# Patient Record
Sex: MALE | Race: WHITE
[De-identification: names, ages, dates, MRNs, and addresses within clinical notes are randomized per-mention and may not be internally consistent; named-entity substitution may affect disease eponyms.]

---

## 2017-02-28 ENCOUNTER — HOSPITAL ENCOUNTER (EMERGENCY)
Dept: HOSPITAL 33 - ED | Age: 42
LOS: 1 days | Discharge: HOME | End: 2017-03-01
Payer: COMMERCIAL

## 2017-02-28 DIAGNOSIS — R41.82: Primary | ICD-10-CM

## 2017-02-28 DIAGNOSIS — J32.9: ICD-10-CM

## 2017-02-28 DIAGNOSIS — F12.90: ICD-10-CM

## 2017-02-28 DIAGNOSIS — F10.129: ICD-10-CM

## 2017-02-28 DIAGNOSIS — R07.9: ICD-10-CM

## 2017-02-28 LAB
ALBUMIN SERPL-MCNC: 3.7 G/DL (ref 3.4–5)
ALP SERPL-CCNC: 71 U/L (ref 46–116)
ALT SERPL-CCNC: 44 U/L (ref 12–78)
ANION GAP SERPL CALC-SCNC: 18.5 MEQ/L (ref 5–15)
APAP SPEC-MCNC: < 2 UG/ML (ref 10–30)
AST SERPL QL: 27 U/L (ref 15–37)
BASOPHILS NFR BLD AUTO: 1.2 % (ref 0–0.4)
BILIRUB BLD-MCNC: 0.2 MG/DL (ref 0.2–1)
BUN SERPL-MCNC: 8 MG/DL (ref 9–20)
CHLORIDE SERPL-SCNC: 106 MEQ/L (ref 98–107)
CO2 SERPL-SCNC: 23.4 MEQ/L (ref 21–32)
GLUCOSE SERPL-MCNC: 114 MG/DL (ref 70–110)
LIPASE SERPL-CCNC: 96 U/L (ref 73–393)
MCH RBC QN AUTO: 33.3 PG (ref 26–32)
NEUTROPHILS NFR BLD AUTO: 50.9 % (ref 36–66)
PLATELET # BLD AUTO: 243 K/MM3 (ref 150–450)
POTASSIUM SERPLBLD-SCNC: 3.6 MEQ/L (ref 3.5–5.1)
PROT SERPL-MCNC: 7.4 GM/DL (ref 6.4–8.2)
RBC # BLD AUTO: 5.14 M/MM3 (ref 4.1–5.6)
SODIUM SERPL-SCNC: 144 MEQ/L (ref 136–145)
TROPONIN T SERPL HS-MCNC: < 0.017 NG/ML (ref 0–0.06)
WBC # BLD AUTO: 9.3 K/MM3 (ref 4–10.5)

## 2017-02-28 PROCEDURE — 80053 COMPREHEN METABOLIC PANEL: CPT

## 2017-02-28 PROCEDURE — 85025 COMPLETE CBC W/AUTO DIFF WBC: CPT

## 2017-02-28 PROCEDURE — 36415 COLL VENOUS BLD VENIPUNCTURE: CPT

## 2017-02-28 PROCEDURE — 83986 ASSAY PH BODY FLUID NOS: CPT

## 2017-02-28 PROCEDURE — 84484 ASSAY OF TROPONIN QUANT: CPT

## 2017-02-28 PROCEDURE — 96365 THER/PROPH/DIAG IV INF INIT: CPT

## 2017-02-28 PROCEDURE — 70450 CT HEAD/BRAIN W/O DYE: CPT

## 2017-02-28 PROCEDURE — 71010: CPT

## 2017-02-28 PROCEDURE — 81002 URINALYSIS NONAUTO W/O SCOPE: CPT

## 2017-02-28 PROCEDURE — 80307 DRUG TEST PRSMV CHEM ANLYZR: CPT

## 2017-02-28 PROCEDURE — 93005 ELECTROCARDIOGRAM TRACING: CPT

## 2017-02-28 PROCEDURE — 80320 DRUG SCREEN QUANTALCOHOLS: CPT

## 2017-02-28 PROCEDURE — 83690 ASSAY OF LIPASE: CPT

## 2017-02-28 PROCEDURE — 93041 RHYTHM ECG TRACING: CPT

## 2017-02-28 PROCEDURE — 96360 HYDRATION IV INFUSION INIT: CPT

## 2017-02-28 PROCEDURE — 99284 EMERGENCY DEPT VISIT MOD MDM: CPT

## 2017-02-28 PROCEDURE — 82962 GLUCOSE BLOOD TEST: CPT

## 2017-02-28 PROCEDURE — 82150 ASSAY OF AMYLASE: CPT

## 2017-02-28 NOTE — ERPHSYRPT
- History of Present Illness


Time Seen by Provider: 02/28/17 21:17


Source: patient


Exam Limitations: no limitations


Physician History: 





TODAY PT HAS BEEN CONSUMING ALCOHOLIC BEVERAGES SINCE GETTING OFF WORK TODAY 

AND C/O CHEST PAIN, ABDOMINAL PAIN AND HAS AMS; DENIES FEVER, VOMITING, DIARRHEA

, COUGH, SWELLING, RASH, HEADACHE. LAST BM WAS THIS AM & WNL.


Allergies/Adverse Reactions: 








No Known Drug Allergies Allergy (Verified 02/28/17 21:54)


 





Home Medications: 








No Home Meds 1 ea  UD 02/28/17 [History]





Hx Influenza Vaccination/Date Given: No


Hx Pneumococcal Vaccination/Date Given: No





- Review of Systems


Constitutional: No Fever


Respiratory: No Cough, No Dyspnea


Cardiac: Chest Pain


Abdominal/Gastrointestinal: Abdominal Pain, No Vomiting, No Diarrhea


Genitourinary Symptoms: No Dysuria


Musculoskeletal: No Back Pain, No Neck Pain


Neurological: No Headache


Endocrine: No Excessive Sweating


All Other Systems: Reviewed and Negative





- Past Medical History


Pertinent Past Medical History: No





- Past Surgical History


Past Surgical History: Yes


Musculoskeletal: Orthopedic Surgery


Other Surgical History: knee surg





- Social History


Smoking Status: Current every day smoker


Exposure to second hand smoke: Yes


Drug Use: none


Patient Lives Alone: No





- Nursing Vital Signs


Nursing Vital Signs: 


 Initial Vital Signs











Temperature                    97.6 F


 


Temperature Source             Oral


 


Pulse Rate                     88


 


Respiratory Rate               18


 


Blood Pressure []              101/65


 


Pain Intensity                 8

















- Physical Exam


General Appearance: lethargy (BUT WILL ANSWER QUESTIONS)


Eye Exam: PERRL/EOMI


Ears, Nose, Throat Exam: TMs normal, moist mucous membranes, pharyngeal erythema


Neck Exam: normal inspection


Respiratory Exam: lungs clear


Cardiovascular Exam: normal heart sounds


Gastrointestinal/Abdomen Exam: soft, normal bowel sounds, No tenderness


Back Exam: normal range of motion


Extremity Exam: normal inspection, No pedal edema


Neurologic Exam: oriented x 3, cooperative, sensation nml, No motor deficits


Skin Exam: warm, dry


**SpO2 Interpretation**: normal


SpO2: 96


Oxygen Delivery: Room Air





- Course


Nursing assessment & vital signs reviewed: Yes


EKG Interpreted by Me: RATE (89), Sinus Rhythm, NORMAL AXIS, NORMAL INTERVALS





- Radiology Exams


  ** Chest


X-ray Interpretation: Interpreted by me, No Pneumonia





- CT Exams


  ** Head


CT Interpretation: Tele-radiologist Report (NO ACUTE INTRACRANIAL FINDINGS. 

SINUSITIS AND MILD RIGHT MASTOID OPACIFICATION.)


Ordered Tests: 


 Active Orders 24 hr











 Category Date Time Status


 


 Accucheck STAT Care  02/28/17 21:21 Active


 


 Cardiac Monitor STAT Care  02/28/17 21:21 Active


 


 EKG-ER Only STAT Care  02/28/17 21:21 Active


 


 IV Insertion STAT Care  02/28/17 21:21 Active


 


 Pulse Oximetry (ED) STAT Care  02/28/17 21:21 Active


 


 CHEST 1 VIEW (PORTABLE) Stat Exams  02/28/17 21:22 Taken


 


 HEAD WITHOUT CONTRAST [CT] Stat Exams  02/28/17 22:48 Taken


 


 ACETAMINOPHEN Stat Lab  02/28/17 21:42 Completed


 


 AMYLASE Stat Lab  02/28/17 21:42 Completed


 


 CBC W DIFF Stat Lab  02/28/17 21:42 Completed


 


 CMP Stat Lab  02/28/17 21:42 Completed


 


 Ethyl Alcohol,Urine Stat Lab  02/28/17 21:21 Completed


 


 LIPASE Stat Lab  02/28/17 21:42 Completed


 


 SALICYLATE Stat Lab  02/28/17 21:42 Completed


 


 TROPONIN Stat Lab  02/28/17 21:42 Completed


 


 UA Stat Lab  02/28/17 23:30 Completed


 


 Urine Triage Profile Stat Lab  02/28/17 23:30 Completed








Medication Summary














Discontinued Medications














Generic Name Dose Route Start Last Admin





  Trade Name Freq  PRN Reason Stop Dose Admin


 


Aspirin  324 mg  02/28/17 21:23  02/28/17 21:43





  Baby Aspirin 81 Mg Chew***  PO  02/28/17 21:24  324 mg





  STAT ONE   Administration


 


Aspirin  Confirm  02/28/17 21:41  





  Baby Aspirin 81 Mg Chew***  Administered  02/28/17 21:42  





  Dose   





  324 mg   





  .ROUTE   





  .STK-MED ONE   


 


Sodium Chloride  1,000 mls @ 999 mls/hr  02/28/17 21:21  02/28/17 21:43





  Sodium Chloride 0.9% 1000 Ml  IV  02/28/17 22:21  999 mls/hr





  .Q1H1M STA   Administration


 


Sodium Chloride  Confirm  02/28/17 21:41  





  Sodium Chloride 0.9% 1000 Ml  Administered  02/28/17 21:42  





  Dose   





  1,000 mls @ ud   





  .ROUTE   





  .STK-MED ONE   


 


Nitroglycerin  0.4 mg  02/28/17 21:23  02/28/17 21:43





  Nitrostat 0.4 Mg (Ed)***  SL  02/28/17 21:24  0.4 mg





  STAT ONE   Administration


 


Nitroglycerin  Confirm  02/28/17 21:41  





  Nitrostat 0.4 Mg (Ed)***  Administered  02/28/17 21:42  





  Dose   





  0.4 mg   





  SL   





  .STK-MED ONE   











Lab/Rad Data: 


 Laboratory Result Diagrams





 02/28/17 21:42 





 02/28/17 21:42 





 Laboratory Results











  02/28/17 02/28/17 02/28/17 Range/Units





  23:30 23:30 21:42 


 


WBC     (4.0-10.5)  K/mm3


 


RBC     (4.1-5.6)  M/mm3


 


Hgb     (12.5-18.0)  gm/dl


 


Hct     (42-50)  %


 


MCV     ()  fl


 


MCH     (26-32)  pg


 


MCHC     (32-36)  g/dl


 


RDW     (11.5-14.0)  %


 


Plt Count     (150-450)  K/mm3


 


MPV     (6-9.5)  fl


 


Gran %     (36.0-66.0)  %


 


Lymphocytes %     (24.0-44.0)  %


 


Monocytes %     (0.0-12.0)  %


 


Eosinophils %     (0.00-5.0)  %


 


Basophils %     (0.0-0.4)  %


 


Basophils #     (0-0.4)  


 


Sodium     (136-145)  mEq/L


 


Potassium     (3.5-5.1)  mEq/L


 


Chloride     ()  mEq/L


 


Carbon Dioxide     (21-32)  mEq/L


 


Anion Gap     (5-15)  MEQ/L


 


BUN     (9-20)  mg/dL


 


Creatinine     (0.55-1.30)  mg/dl


 


Estimated GFR     ML/MIN


 


Glucose     ()  MG/DL


 


Calcium     (8.5-10.1)  mg/dL


 


Total Bilirubin     (0.2-1.0)  mg/dL


 


AST     (15-37)  U/L


 


ALT     (12-78)  U/L


 


Alkaline Phosphatase     ()  U/L


 


Troponin I    < 0.017  (0.000-0.056)  ng/ml


 


Serum Total Protein     (6.4-8.2)  gm/dL


 


Albumin     (3.4-5.0)  g/dL


 


Amylase    52  ()  U/L


 


Lipase    96  ()  U/L


 


Ur Collection Type   CLEAN CATCH   


 


Urine Color   YELLOW   (YELLOW)  


 


Urine Appearance   CLEAR   (CLEAR)  


 


Ur Specific Gravity   <=1.005   (1.005-1.025)  


 


Urine Protein   NEGATIVE   (Negative)  


 


Urine Glucose (UA)   NEGATIVE   (NEGATIVE)  mg/dL


 


Urine Ketones   NEGATIVE   (NEGATIVE)  


 


Urine Nitrite   NEGATIVE   (NEGATIVE)  


 


Urine Bilirubin   NEGATIVE   (NEGATIVE)  


 


Urine Urobilinogen   0.2   (0-1)  mg/dL


 


Urine WBC (Auto)   NEGATIVE   (NEGATIVE)  


 


Urine RBC (Auto)   NEGATIVE   (0-5)  Finesse/ul


 


Salicylates     (2.8-20.0)  mg/dl


 


Urine Opiates Level  NEG.    (NEGATIVE)  


 


Ur Methadone  NEG.    (NEGATIVE)  


 


Acetaminophen     (10-30)  ug/ml


 


Urine Barbiturates  NEG.    (NEGATIVE)  


 


Ur Phencyclidine (PCP)  NEG.    (NEGATIVE)  


 


Urine Amphetamine  NEG.    (NEGATIVE)  


 


U Benzodiazepine Level  NEG.    (NEGATIVE)  


 


Urine Cocaine  NEG.    (NEGATIVE)  


 


Urine Marijuana (THC)  POS.    (NEGATIVE)  


 


Urine pH   5.5   (3-8.5)  


 


Urine Ethyl Alcohol     (0.00-20)  mg/dl


 


Specimen Received   2/28/17 2330   














  02/28/17 02/28/17 02/28/17 Range/Units





  21:42 21:42 21:21 


 


WBC   9.3   (4.0-10.5)  K/mm3


 


RBC   5.14   (4.1-5.6)  M/mm3


 


Hgb   17.1   (12.5-18.0)  gm/dl


 


Hct   49.9   (42-50)  %


 


MCV   97.1   ()  fl


 


MCH   33.3 H   (26-32)  pg


 


MCHC   34.3   (32-36)  g/dl


 


RDW   14.4 H   (11.5-14.0)  %


 


Plt Count   243   (150-450)  K/mm3


 


MPV   9.8 H   (6-9.5)  fl


 


Gran %   50.9   (36.0-66.0)  %


 


Lymphocytes %   37.5   (24.0-44.0)  %


 


Monocytes %   7.3   (0.0-12.0)  %


 


Eosinophils %   3.1   (0.00-5.0)  %


 


Basophils %   1.2   (0.0-0.4)  %


 


Basophils #   0.11   (0-0.4)  


 


Sodium  144    (136-145)  mEq/L


 


Potassium  3.6    (3.5-5.1)  mEq/L


 


Chloride  106    ()  mEq/L


 


Carbon Dioxide  23.4    (21-32)  mEq/L


 


Anion Gap  18.5 H    (5-15)  MEQ/L


 


BUN  8 L    (9-20)  mg/dL


 


Creatinine  0.90    (0.55-1.30)  mg/dl


 


Estimated GFR  > 60    ML/MIN


 


Glucose  114 H    ()  MG/DL


 


Calcium  8.6    (8.5-10.1)  mg/dL


 


Total Bilirubin  0.2    (0.2-1.0)  mg/dL


 


AST  27    (15-37)  U/L


 


ALT  44    (12-78)  U/L


 


Alkaline Phosphatase  71    ()  U/L


 


Troponin I     (0.000-0.056)  ng/ml


 


Serum Total Protein  7.4    (6.4-8.2)  gm/dL


 


Albumin  3.7    (3.4-5.0)  g/dL


 


Amylase     ()  U/L


 


Lipase     ()  U/L


 


Ur Collection Type     


 


Urine Color     (YELLOW)  


 


Urine Appearance     (CLEAR)  


 


Ur Specific Gravity     (1.005-1.025)  


 


Urine Protein     (Negative)  


 


Urine Glucose (UA)     (NEGATIVE)  mg/dL


 


Urine Ketones     (NEGATIVE)  


 


Urine Nitrite     (NEGATIVE)  


 


Urine Bilirubin     (NEGATIVE)  


 


Urine Urobilinogen     (0-1)  mg/dL


 


Urine WBC (Auto)     (NEGATIVE)  


 


Urine RBC (Auto)     (0-5)  Finesse/ul


 


Salicylates  5.0    (2.8-20.0)  mg/dl


 


Urine Opiates Level     (NEGATIVE)  


 


Ur Methadone     (NEGATIVE)  


 


Acetaminophen  < 2.0 L    (10-30)  ug/ml


 


Urine Barbiturates     (NEGATIVE)  


 


Ur Phencyclidine (PCP)     (NEGATIVE)  


 


Urine Amphetamine     (NEGATIVE)  


 


U Benzodiazepine Level     (NEGATIVE)  


 


Urine Cocaine     (NEGATIVE)  


 


Urine Marijuana (THC)     (NEGATIVE)  


 


Urine pH    5.5  (3-8.5)  


 


Urine Ethyl Alcohol    177 H  (0.00-20)  mg/dl


 


Specimen Received     














- Departure


Time of Disposition: 00:30


Departure Disposition: Home


Clinical Impression: 


 ALTERED MENTAL STATUS, ALCOHOL INTOXICATION, MARIJUANA USE, SINUSITIS, CHEST 

PAIN





Condition: Stable


Critical Care Time: No


Referrals: 


DOCTOR,NO FAMILY [Primary Care Provider] - 


Instructions:  Chest Pain, Sinusitis


Additional Instructions: 


FOLLOW UP WITH PRIVATE DOCTOR TOMORROW.





Prescriptions: 


Azithromycin 250 mg*** [Zithromax 250 MG TABLET***] 250 mg PO ZPACK #6 tablet

## 2017-03-01 VITALS — HEART RATE: 67 BPM | DIASTOLIC BLOOD PRESSURE: 69 MMHG | SYSTOLIC BLOOD PRESSURE: 104 MMHG | OXYGEN SATURATION: 97 %

## 2017-03-01 LAB
COLLECTION TYPE: (no result)
COMPLETE URINE MICROSCOPIC?: NO

## 2017-03-01 NOTE — XRAY
Indication: Acute mental status change.  Confusion.  No known injury.



Multiple contiguous axial images obtained through the head without contrast.



Comparison: None.



Normal appearing brain parenchyma, ventricles, and bony calvarium.  Mild

mucosal thickening of both ethmoid and left frontal sinuses.  Partial

opacification of the mastoid air cells, right greater than left.



Impression: No acute intracranial abnormalities.  Incidental paranasal sinus

disease.  Opacification of the mastoid air cells also presumed inflammatory.



Comment: Preliminary interpretation was made by [facility].  No discrepancy.



CTDI 68.32

## 2017-03-01 NOTE — XRAY
Indication: Chest pain.



Comparison: None



Portable chest underinflated and clear.  Heart and mediastinal structures

within normal limits for AP portable technique.  Bony thorax intact with old

right 4th/5th rib fractures.



Impression: Nonacute underinflated chest.

## 2018-12-30 ENCOUNTER — HOSPITAL ENCOUNTER (EMERGENCY)
Dept: HOSPITAL 33 - ED | Age: 43
Discharge: HOME | End: 2018-12-30
Payer: COMMERCIAL

## 2018-12-30 VITALS — SYSTOLIC BLOOD PRESSURE: 101 MMHG | HEART RATE: 92 BPM | DIASTOLIC BLOOD PRESSURE: 68 MMHG

## 2018-12-30 VITALS — OXYGEN SATURATION: 95 %
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
## 2018-12-30 DIAGNOSIS — S50.02XA: Primary | ICD-10-CM
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
## 2018-12-30 PROCEDURE — 73080 X-RAY EXAM OF ELBOW: CPT

## 2018-12-30 PROCEDURE — 99283 EMERGENCY DEPT VISIT LOW MDM: CPT

## 2018-12-30 NOTE — XRAY
Indication: Pain following fall.



Comparison: none



3 views of the left elbow obtained.  No bony, articular, or soft tissue

abnormalities.



Comment: Preliminary interpretation was made by [facility].  No discrepancy.
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
## 2020-05-18 ENCOUNTER — HOSPITAL ENCOUNTER (EMERGENCY)
Dept: HOSPITAL 33 - ED | Age: 45
LOS: 1 days | Discharge: ANOTHER HOSPITAL | End: 2020-05-19
Payer: SELF-PAY

## 2020-05-18 DIAGNOSIS — I20.0: Primary | ICD-10-CM

## 2020-05-18 DIAGNOSIS — Z72.0: ICD-10-CM

## 2020-05-18 DIAGNOSIS — I24.9: ICD-10-CM

## 2020-05-18 LAB
ALBUMIN SERPL-MCNC: 4.4 G/DL (ref 3.5–5)
ALP SERPL-CCNC: 73 U/L (ref 38–126)
ALT SERPL-CCNC: 119 U/L (ref 0–50)
AMPHETAMINES UR QL: NEGATIVE
ANION GAP SERPL CALC-SCNC: 15.8 MEQ/L (ref 5–15)
AST SERPL QL: 60 U/L (ref 17–59)
BARBITURATES UR QL: NEGATIVE
BASOPHILS # BLD AUTO: 0.05 10*3/UL (ref 0–0.4)
BASOPHILS NFR BLD AUTO: 0.6 % (ref 0–0.4)
BENZODIAZ UR QL SCN: NEGATIVE
BILIRUB BLD-MCNC: 0.6 MG/DL (ref 0.2–1.3)
BNP SERPL-MCNC: 107 PG/ML (ref 0–450)
BUN SERPL-MCNC: 11 MG/DL (ref 9–20)
CALCIUM SPEC-MCNC: 9.7 MG/DL (ref 8.4–10.2)
CHLORIDE SERPL-SCNC: 107 MMOL/L (ref 98–107)
CO2 SERPL-SCNC: 23 MMOL/L (ref 22–30)
COCAINE UR QL SCN: NEGATIVE
CREAT SERPL-MCNC: 0.69 MG/DL (ref 0.66–1.25)
EOSINOPHIL # BLD AUTO: 0.37 10*3/UL (ref 0–0.5)
GLUCOSE SERPL-MCNC: 94 MG/DL (ref 74–106)
HCT VFR BLD AUTO: 49.6 % (ref 42–50)
HGB BLD-MCNC: 17.3 GM/DL (ref 12.5–18)
LYMPHOCYTES # SPEC AUTO: 2.86 10*3/UL (ref 1–4.6)
MCH RBC QN AUTO: 35.7 PG (ref 26–32)
MCHC RBC AUTO-ENTMCNC: 34.9 G/DL (ref 32–36)
METHADONE UR QL: NEGATIVE
MONOCYTES # BLD AUTO: 0.77 10*3/UL (ref 0–1.3)
OPIATES UR QL: POSITIVE
PCP UR QL CFM>20 NG/ML: NEGATIVE
PLATELET # BLD AUTO: 205 K/MM3 (ref 150–450)
POTASSIUM SERPLBLD-SCNC: 4.1 MMOL/L (ref 3.5–5.1)
PROT SERPL-MCNC: 8.2 G/DL (ref 6.3–8.2)
RBC # BLD AUTO: 4.84 M/MM3 (ref 4.1–5.6)
SODIUM SERPL-SCNC: 142 MMOL/L (ref 137–145)
THC UR QL SCN: POSITIVE
WBC # BLD AUTO: 8.8 K/MM3 (ref 4–10.5)

## 2020-05-18 PROCEDURE — 93005 ELECTROCARDIOGRAM TRACING: CPT

## 2020-05-18 PROCEDURE — 99285 EMERGENCY DEPT VISIT HI MDM: CPT

## 2020-05-18 PROCEDURE — 36000 PLACE NEEDLE IN VEIN: CPT

## 2020-05-18 PROCEDURE — 85025 COMPLETE CBC W/AUTO DIFF WBC: CPT

## 2020-05-18 PROCEDURE — 96376 TX/PRO/DX INJ SAME DRUG ADON: CPT

## 2020-05-18 PROCEDURE — 80053 COMPREHEN METABOLIC PANEL: CPT

## 2020-05-18 PROCEDURE — 94760 N-INVAS EAR/PLS OXIMETRY 1: CPT

## 2020-05-18 PROCEDURE — 85379 FIBRIN DEGRADATION QUANT: CPT

## 2020-05-18 PROCEDURE — 71045 X-RAY EXAM CHEST 1 VIEW: CPT

## 2020-05-18 PROCEDURE — 96374 THER/PROPH/DIAG INJ IV PUSH: CPT

## 2020-05-18 PROCEDURE — 36415 COLL VENOUS BLD VENIPUNCTURE: CPT

## 2020-05-18 PROCEDURE — 93041 RHYTHM ECG TRACING: CPT

## 2020-05-18 PROCEDURE — 83880 ASSAY OF NATRIURETIC PEPTIDE: CPT

## 2020-05-18 PROCEDURE — 99292 CRITICAL CARE ADDL 30 MIN: CPT

## 2020-05-18 PROCEDURE — 80307 DRUG TEST PRSMV CHEM ANLYZR: CPT

## 2020-05-18 PROCEDURE — 99291 CRITICAL CARE FIRST HOUR: CPT

## 2020-05-18 PROCEDURE — 84484 ASSAY OF TROPONIN QUANT: CPT
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
## 2020-05-19 VITALS — SYSTOLIC BLOOD PRESSURE: 121 MMHG | DIASTOLIC BLOOD PRESSURE: 79 MMHG | OXYGEN SATURATION: 98 % | HEART RATE: 73 BPM

## 2020-05-19 NOTE — XRAY
Indication: Chest pain.



Comparison: February 28, 2017.



Portable chest demonstrates normal heart and lungs.  Bony thorax intact again

with old right rib fractures.  No new/acute findings.

## 2020-05-23 ENCOUNTER — HOSPITAL ENCOUNTER (EMERGENCY)
Dept: HOSPITAL 33 - ED | Age: 45
Discharge: LEFT BEFORE DISCHARGE | End: 2020-05-23
Payer: SELF-PAY

## 2020-05-23 VITALS — SYSTOLIC BLOOD PRESSURE: 128 MMHG | OXYGEN SATURATION: 94 % | DIASTOLIC BLOOD PRESSURE: 72 MMHG | HEART RATE: 103 BPM

## 2020-05-23 DIAGNOSIS — Z79.899: ICD-10-CM

## 2020-05-23 DIAGNOSIS — R07.89: Primary | ICD-10-CM

## 2020-05-23 DIAGNOSIS — R06.02: ICD-10-CM

## 2020-05-23 DIAGNOSIS — I10: ICD-10-CM

## 2020-05-23 DIAGNOSIS — E78.01: ICD-10-CM

## 2020-05-23 DIAGNOSIS — E78.5: ICD-10-CM

## 2020-05-23 DIAGNOSIS — R11.0: ICD-10-CM

## 2020-05-23 LAB
ALBUMIN SERPL-MCNC: 4.6 G/DL (ref 3.5–5)
ALP SERPL-CCNC: 71 U/L (ref 38–126)
ALT SERPL-CCNC: 132 U/L (ref 0–50)
AMPHETAMINES UR QL: NEGATIVE
AMYLASE SERPL-CCNC: 100 U/L (ref 30–110)
ANION GAP SERPL CALC-SCNC: 18.6 MEQ/L (ref 5–15)
APTT PPP: 31.6 SECONDS (ref 24.1–36.1)
AST SERPL QL: 65 U/L (ref 17–59)
BARBITURATES UR QL: NEGATIVE
BASOPHILS # BLD AUTO: 0.07 10*3/UL (ref 0–0.4)
BASOPHILS NFR BLD AUTO: 0.7 % (ref 0–0.4)
BENZODIAZ UR QL SCN: NEGATIVE
BILIRUB BLD-MCNC: 0.6 MG/DL (ref 0.2–1.3)
BUN SERPL-MCNC: 16 MG/DL (ref 9–20)
CALCIUM SPEC-MCNC: 9.8 MG/DL (ref 8.4–10.2)
CHLORIDE SERPL-SCNC: 108 MMOL/L (ref 98–107)
CO2 SERPL-SCNC: 21 MMOL/L (ref 22–30)
COCAINE UR QL SCN: NEGATIVE
CREAT SERPL-MCNC: 0.84 MG/DL (ref 0.66–1.25)
D DIMER BLD IA.RAPID-MCNC: 479 NG/ML (ref 215–500)
EOSINOPHIL # BLD AUTO: 0.29 10*3/UL (ref 0–0.5)
GLUCOSE SERPL-MCNC: 111 MG/DL (ref 74–106)
GLUCOSE UR-MCNC: NEGATIVE MG/DL
HCT VFR BLD AUTO: 50.5 % (ref 42–50)
HGB BLD-MCNC: 17.9 GM/DL (ref 12.5–18)
INR PPP: 0.92 (ref 0.8–3)
LIPASE SERPL-CCNC: 49 U/L (ref 23–300)
LYMPHOCYTES # SPEC AUTO: 2.8 10*3/UL (ref 1–4.6)
MCH RBC QN AUTO: 36.1 PG (ref 26–32)
MCHC RBC AUTO-ENTMCNC: 35.4 G/DL (ref 32–36)
METHADONE UR QL: NEGATIVE
MONOCYTES # BLD AUTO: 1.07 10*3/UL (ref 0–1.3)
OPIATES UR QL: NEGATIVE
PCP UR QL CFM>20 NG/ML: NEGATIVE
PLATELET # BLD AUTO: 250 K/MM3 (ref 150–450)
POTASSIUM SERPLBLD-SCNC: 3.8 MMOL/L (ref 3.5–5.1)
PROT SERPL-MCNC: 8.6 G/DL (ref 6.3–8.2)
PROT UR STRIP-MCNC: NEGATIVE MG/DL
PROTHROMBIN TIME: 10.4 SECONDS (ref 8.83–12.87)
RBC # BLD AUTO: 4.96 M/MM3 (ref 4.1–5.6)
RBC #/AREA URNS HPF: (no result) /HPF (ref 0–2)
SODIUM SERPL-SCNC: 144 MMOL/L (ref 137–145)
THC UR QL SCN: POSITIVE
WBC # BLD AUTO: 10.2 K/MM3 (ref 4–10.5)
WBC #/AREA URNS HPF: (no result) /HPF (ref 0–5)

## 2020-05-23 PROCEDURE — 36000 PLACE NEEDLE IN VEIN: CPT

## 2020-05-23 PROCEDURE — 85379 FIBRIN DEGRADATION QUANT: CPT

## 2020-05-23 PROCEDURE — 85025 COMPLETE CBC W/AUTO DIFF WBC: CPT

## 2020-05-23 PROCEDURE — 99291 CRITICAL CARE FIRST HOUR: CPT

## 2020-05-23 PROCEDURE — 80053 COMPREHEN METABOLIC PANEL: CPT

## 2020-05-23 PROCEDURE — 82150 ASSAY OF AMYLASE: CPT

## 2020-05-23 PROCEDURE — 96365 THER/PROPH/DIAG IV INF INIT: CPT

## 2020-05-23 PROCEDURE — 85730 THROMBOPLASTIN TIME PARTIAL: CPT

## 2020-05-23 PROCEDURE — 36415 COLL VENOUS BLD VENIPUNCTURE: CPT

## 2020-05-23 PROCEDURE — 71045 X-RAY EXAM CHEST 1 VIEW: CPT

## 2020-05-23 PROCEDURE — 93041 RHYTHM ECG TRACING: CPT

## 2020-05-23 PROCEDURE — 96361 HYDRATE IV INFUSION ADD-ON: CPT

## 2020-05-23 PROCEDURE — 96360 HYDRATION IV INFUSION INIT: CPT

## 2020-05-23 PROCEDURE — 84484 ASSAY OF TROPONIN QUANT: CPT

## 2020-05-23 PROCEDURE — 93005 ELECTROCARDIOGRAM TRACING: CPT

## 2020-05-23 PROCEDURE — 83605 ASSAY OF LACTIC ACID: CPT

## 2020-05-23 PROCEDURE — 96375 TX/PRO/DX INJ NEW DRUG ADDON: CPT

## 2020-05-23 PROCEDURE — 85610 PROTHROMBIN TIME: CPT

## 2020-05-23 PROCEDURE — 99285 EMERGENCY DEPT VISIT HI MDM: CPT

## 2020-05-23 PROCEDURE — 81001 URINALYSIS AUTO W/SCOPE: CPT

## 2020-05-23 PROCEDURE — 96374 THER/PROPH/DIAG INJ IV PUSH: CPT

## 2020-05-23 PROCEDURE — 83690 ASSAY OF LIPASE: CPT

## 2020-05-23 PROCEDURE — 80307 DRUG TEST PRSMV CHEM ANLYZR: CPT

## 2020-05-23 NOTE — XRAY
Indication: Chest pain.



Comparison: May 18, 2020.



Portable apical lordotic chest less inflated and clear.  Heart is not

enlarged.  Bony thorax intact with stable distal right clavicle heterotopic

ossifications.



Impression: Nonacute underinflated chest.
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
## 2020-06-14 ENCOUNTER — HOSPITAL ENCOUNTER (OUTPATIENT)
Dept: HOSPITAL 33 - ED | Age: 45
Setting detail: OBSERVATION
LOS: 1 days | Discharge: HOME | End: 2020-06-15
Attending: FAMILY MEDICINE | Admitting: FAMILY MEDICINE
Payer: SELF-PAY

## 2020-06-14 DIAGNOSIS — R42: ICD-10-CM

## 2020-06-14 DIAGNOSIS — I25.10: ICD-10-CM

## 2020-06-14 DIAGNOSIS — Z79.899: ICD-10-CM

## 2020-06-14 DIAGNOSIS — R55: ICD-10-CM

## 2020-06-14 DIAGNOSIS — I10: ICD-10-CM

## 2020-06-14 DIAGNOSIS — F17.200: ICD-10-CM

## 2020-06-14 DIAGNOSIS — E78.00: ICD-10-CM

## 2020-06-14 DIAGNOSIS — R00.2: ICD-10-CM

## 2020-06-14 DIAGNOSIS — R07.9: Primary | ICD-10-CM

## 2020-06-14 LAB
ALBUMIN SERPL-MCNC: 4.6 G/DL (ref 3.5–5)
ALP SERPL-CCNC: 76 U/L (ref 38–126)
ALT SERPL-CCNC: 99 U/L (ref 0–50)
AMPHETAMINES UR QL: NEGATIVE
ANION GAP SERPL CALC-SCNC: 19.3 MEQ/L (ref 5–15)
AST SERPL QL: 57 U/L (ref 17–59)
BARBITURATES UR QL: NEGATIVE
BASOPHILS # BLD AUTO: 0.06 10*3/UL (ref 0–0.4)
BASOPHILS NFR BLD AUTO: 0.6 % (ref 0–0.4)
BENZODIAZ UR QL SCN: NEGATIVE
BILIRUB BLD-MCNC: 0.8 MG/DL (ref 0.2–1.3)
BUN SERPL-MCNC: 11 MG/DL (ref 9–20)
CALCIUM SPEC-MCNC: 9.9 MG/DL (ref 8.4–10.2)
CHLORIDE SERPL-SCNC: 106 MMOL/L (ref 98–107)
CK SERPL-CCNC: 95 U/L (ref 55–170)
CO2 SERPL-SCNC: 19 MMOL/L (ref 22–30)
COCAINE UR QL SCN: NEGATIVE
CREAT SERPL-MCNC: 0.76 MG/DL (ref 0.66–1.25)
EOSINOPHIL # BLD AUTO: 0.22 10*3/UL (ref 0–0.5)
GLUCOSE SERPL-MCNC: 111 MG/DL (ref 74–106)
GLUCOSE UR-MCNC: NEGATIVE MG/DL
HCT VFR BLD AUTO: 50.1 % (ref 42–50)
HGB BLD-MCNC: 17.6 GM/DL (ref 12.5–18)
LIPASE SERPL-CCNC: 33 U/L (ref 23–300)
LYMPHOCYTES # SPEC AUTO: 3.07 10*3/UL (ref 1–4.6)
MCH RBC QN AUTO: 35.9 PG (ref 26–32)
MCHC RBC AUTO-ENTMCNC: 35.1 G/DL (ref 32–36)
METHADONE UR QL: NEGATIVE
MONOCYTES # BLD AUTO: 1.14 10*3/UL (ref 0–1.3)
OPIATES UR QL: NEGATIVE
PCP UR QL CFM>20 NG/ML: NEGATIVE
PLATELET # BLD AUTO: 223 K/MM3 (ref 150–450)
POTASSIUM SERPLBLD-SCNC: 4.1 MMOL/L (ref 3.5–5.1)
PROT SERPL-MCNC: 8.4 G/DL (ref 6.3–8.2)
PROT UR STRIP-MCNC: NEGATIVE MG/DL
RBC # BLD AUTO: 4.9 M/MM3 (ref 4.1–5.6)
RBC #/AREA URNS HPF: (no result) /HPF (ref 0–2)
SODIUM SERPL-SCNC: 141 MMOL/L (ref 137–145)
THC UR QL SCN: POSITIVE
WBC # BLD AUTO: 10.9 K/MM3 (ref 4–10.5)
WBC #/AREA URNS HPF: (no result) /HPF (ref 0–5)

## 2020-06-14 PROCEDURE — 84484 ASSAY OF TROPONIN QUANT: CPT

## 2020-06-14 PROCEDURE — 81001 URINALYSIS AUTO W/SCOPE: CPT

## 2020-06-14 PROCEDURE — 83690 ASSAY OF LIPASE: CPT

## 2020-06-14 PROCEDURE — 36415 COLL VENOUS BLD VENIPUNCTURE: CPT

## 2020-06-14 PROCEDURE — 99285 EMERGENCY DEPT VISIT HI MDM: CPT

## 2020-06-14 PROCEDURE — G0378 HOSPITAL OBSERVATION PER HR: HCPCS

## 2020-06-14 PROCEDURE — 80053 COMPREHEN METABOLIC PANEL: CPT

## 2020-06-14 PROCEDURE — 85379 FIBRIN DEGRADATION QUANT: CPT

## 2020-06-14 PROCEDURE — 99291 CRITICAL CARE FIRST HOUR: CPT

## 2020-06-14 PROCEDURE — 82550 ASSAY OF CK (CPK): CPT

## 2020-06-14 PROCEDURE — 36000 PLACE NEEDLE IN VEIN: CPT

## 2020-06-14 PROCEDURE — 85025 COMPLETE CBC W/AUTO DIFF WBC: CPT

## 2020-06-14 PROCEDURE — 71045 X-RAY EXAM CHEST 1 VIEW: CPT

## 2020-06-14 PROCEDURE — 93268 ECG RECORD/REVIEW: CPT

## 2020-06-14 PROCEDURE — 83605 ASSAY OF LACTIC ACID: CPT

## 2020-06-14 PROCEDURE — 93005 ELECTROCARDIOGRAM TRACING: CPT

## 2020-06-14 PROCEDURE — 70450 CT HEAD/BRAIN W/O DYE: CPT

## 2020-06-14 PROCEDURE — 80307 DRUG TEST PRSMV CHEM ANLYZR: CPT

## 2020-06-14 PROCEDURE — 96360 HYDRATION IV INFUSION INIT: CPT

## 2020-06-14 RX ADMIN — MORPHINE SULFATE PRN MG: 4 INJECTION, SOLUTION INTRAMUSCULAR; INTRAVENOUS at 23:05

## 2020-06-14 RX ADMIN — FAMOTIDINE SCH MG: 10 INJECTION INTRAVENOUS at 23:11

## 2020-06-14 RX ADMIN — NICOTINE SCH MG: 21 PATCH, EXTENDED RELEASE TRANSDERMAL at 23:12
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
## 2020-06-15 VITALS — HEART RATE: 78 BPM | DIASTOLIC BLOOD PRESSURE: 91 MMHG | SYSTOLIC BLOOD PRESSURE: 148 MMHG | OXYGEN SATURATION: 94 %

## 2020-06-15 LAB
ALBUMIN SERPL-MCNC: 4.1 G/DL (ref 3.5–5)
ALP SERPL-CCNC: 69 U/L (ref 38–126)
ALT SERPL-CCNC: 88 U/L (ref 0–50)
ANION GAP SERPL CALC-SCNC: 9.2 MEQ/L (ref 5–15)
AST SERPL QL: 52 U/L (ref 17–59)
BASOPHILS # BLD AUTO: 0.05 10*3/UL (ref 0–0.4)
BASOPHILS NFR BLD AUTO: 0.7 % (ref 0–0.4)
BILIRUB BLD-MCNC: 1.1 MG/DL (ref 0.2–1.3)
BUN SERPL-MCNC: 11 MG/DL (ref 9–20)
CALCIUM SPEC-MCNC: 9.1 MG/DL (ref 8.4–10.2)
CHLORIDE SERPL-SCNC: 106 MMOL/L (ref 98–107)
CO2 SERPL-SCNC: 28 MMOL/L (ref 22–30)
CREAT SERPL-MCNC: 0.82 MG/DL (ref 0.66–1.25)
EOSINOPHIL # BLD AUTO: 0.3 10*3/UL (ref 0–0.5)
GLUCOSE SERPL-MCNC: 97 MG/DL (ref 74–106)
HCT VFR BLD AUTO: 48.3 % (ref 42–50)
HGB BLD-MCNC: 16.7 GM/DL (ref 12.5–18)
LYMPHOCYTES # SPEC AUTO: 2.22 10*3/UL (ref 1–4.6)
MCH RBC QN AUTO: 36 PG (ref 26–32)
MCHC RBC AUTO-ENTMCNC: 34.6 G/DL (ref 32–36)
MONOCYTES # BLD AUTO: 0.89 10*3/UL (ref 0–1.3)
PLATELET # BLD AUTO: 198 K/MM3 (ref 150–450)
POTASSIUM SERPLBLD-SCNC: 4.6 MMOL/L (ref 3.5–5.1)
PROT SERPL-MCNC: 7.7 G/DL (ref 6.3–8.2)
RBC # BLD AUTO: 4.64 M/MM3 (ref 4.1–5.6)
SODIUM SERPL-SCNC: 139 MMOL/L (ref 137–145)
WBC # BLD AUTO: 7.7 K/MM3 (ref 4–10.5)

## 2020-06-15 RX ADMIN — MORPHINE SULFATE PRN MG: 4 INJECTION, SOLUTION INTRAMUSCULAR; INTRAVENOUS at 03:53

## 2020-06-15 RX ADMIN — MORPHINE SULFATE PRN MG: 4 INJECTION, SOLUTION INTRAMUSCULAR; INTRAVENOUS at 12:10

## 2020-06-15 RX ADMIN — FAMOTIDINE SCH MG: 10 INJECTION INTRAVENOUS at 09:50

## 2020-06-15 RX ADMIN — NICOTINE SCH MG: 21 PATCH, EXTENDED RELEASE TRANSDERMAL at 14:47

## 2020-06-15 NOTE — XRAY
Indication: Chest pain.



Comparison: May 23, 2020.



Portable apical lordotic chest remains clear.  Heart and mediastinal

structures within normal limits.  Bony thorax intact with stable distal right

clavicle heterotopic ossifications.



Impression: Continued nonacute chest.

## 2020-06-15 NOTE — PCM.DCORD
- Discharge


Disposition: Home, Self-Care


Condition: Stable


Prescriptions: 


Continue


   Aspirin EC 81 mg*** [Ecotrin 81 mg***] 81 mg PO DAILY


   Metoprolol Succinate 25 mg Xl* [Toprol-Xl 25MG Tablets***] 25 mg PO DAILY


   Isosorbide Mononitrate 60 mg** [Imdur 60MG**] 60 mg PO DAILY


   Omeprazole 20 mg PO DAILY


Additional Instructions: 


CALL DR YOUNG'S OFFICE TOMORROW AND MAKE A 1 WEEK FOLLOW UP APPOINTMENT.


Follow up with: 


ALEJANDRO YOUNG MD [NON-STAFF PHY W/O PRIVILEGES] - 1 Week


DOCTOR,NO FAMILY [Primary Care Provider] - 1 Week

## 2020-06-15 NOTE — PCM.SSS
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  U/L


 


ALT     (0-50)  U/L


 


Alkaline Phosphatase     ()  U/L


 


Creatine Kinase     ()  U/L


 


Troponin I  < 0.012    (0.000-0.034)  ng/mL


 


Serum Total Protein     (6.3-8.2)  g/dL


 


Albumin     (3.5-5.0)  g/dL


 


Lipase     ()  U/L


 


Urine Color    YELLOW  (YELLOW)  


 


Urine Appearance    CLEAR  (CLEAR)  


 


Urine pH    6.0  (5-6)  


 


Ur Specific Gravity    1.012  (1.005-1.025)  


 


Urine Protein    NEGATIVE  (Negative)  


 


Urine Ketones    NEGATIVE  (NEGATIVE)  


 


Urine Blood    NEGATIVE  (0-5)  Finesse/ul


 


Urine Nitrite    NEGATIVE  (NEGATIVE)  


 


Urine Bilirubin    NEGATIVE  (NEGATIVE)  


 


Urine Urobilinogen    NEGATIVE  (0-1)  mg/dL


 


Ur Leukocyte Esterase    NEGATIVE  (NEGATIVE)  


 


Urine WBC (Auto)    NONE SEEN  (0-5)  /HPF


 


Urine RBC (Auto)    NONE  (0-2)  /HPF


 


U Hyaline Cast (Auto)    3-5  (0-2)  /LPF


 


U Epithel Cells (Auto)    NONE  (FEW)  /HPF


 


Urine Bacteria (Auto)    NONE SEEN  (NEGATIVE)  /HPF


 


Urine Mucus (Auto)    SLIGHT  (NEGATIVE)  /HPF


 


Urine Culture Reflexed    NO  (NO)  


 


Urine Glucose    NEGATIVE  (NEGATIVE)  mg/dL


 


Urine Opiates Level     (NEGATIVE)  


 


Ur Methadone     (NEGATIVE)  


 


Urine Barbiturates     (NEGATIVE)  


 


Ur Phencyclidine (PCP)     (NEGATIVE)  


 


Urine Amphetamine     (NEGATIVE)  


 


U Benzodiazepine Level     (NEGATIVE)  


 


Urine Cocaine     (NEGATIVE)  


 


Urine Marijuana (THC)     (NEGATIVE)  














  06/14/20 06/14/20 06/14/20 Range/Units





  19:24 20:36 20:48 


 


WBC     (4.0-10.5)  K/mm3


 


RBC     (4.1-5.6)  M/mm3


 


Hgb     (12.5-18.0)  gm/dl


 


Hct     (42-50)  %


 


MCV     ()  fl


 


MCH     (26-32)  pg


 


MCHC     (32-36)  g/dl


 


RDW     (11.5-14.0)  %


 


Plt Count     (150-450)  K/mm3


 


MPV     (7.5-11.0)  fl


 


Gran %     (36.0-66.0)  %


 


Eos # (Auto)     (0-0.5)  


 


Absolute Lymphs (auto)     (1.0-4.6)  


 


Absolute Monos (auto)     (0.0-1.3)  


 


Lymphocytes %     (24.0-44.0)  %


 


Monocytes %     (0.0-12.0)  %


 


Eosinophils %     (0.00-5.0)  %


 


Basophils %     (0.0-0.4)  %


 


Absolute Granulocytes     (1.4-6.9)  


 


Basophils #     (0-0.4)  


 


D-Dimer     (215-500)  ng/mL


 


Sodium     (137-145)  mmol/L


 


Potassium     (3.5-5.1)  mmol/L


 


Chloride     ()  mmol/L


 


Carbon Dioxide     (22-30)  mmol/L


 


Anion Gap     (5-15)  MEQ/L


 


BUN     (9-20)  mg/dL


 


Creatinine     (0.66-1.25)  mg/dL


 


Estimated GFR     ML/MIN


 


Glucose     ()  mg/dL


 


Lactic Acid   2.1 H   (0.4-2.0)  


 


Calcium     (8.4-10.2)  mg/dL


 


Total Bilirubin     (0.2-1.3)  mg/dL


 


AST     (17-59)  U/L


 


ALT     (0-50)  U/L


 


Alkaline Phosphatase     ()  U/L


 


Creatine Kinase     ()  U/L


 


Troponin I    < 0.012  (0.000-0.034)  ng/mL


 


Serum Total Protein     (6.3-8.2)  g/dL


 


Albumin     (3.5-5.0)  g/dL


 


Lipase     ()  U/L


 


Urine Color     (YELLOW)  


 


Urine Appearance     (CLEAR)  


 


Urine pH     (5-6)  


 


Ur Specific Gravity     (1.005-1.025)  


 


Urine Protein     (Negative)  


 


Urine Ketones     (NEGATIVE)  


 


Urine Blood     (0-5)  Finesse/ul


 


Urine Nitrite     (NEGATIVE)  


 


Urine Bilirubin     (NEGATIVE)  


 


Urine Urobilinogen     (0-1)  mg/dL


 


Ur Leukocyte Esterase     (NEGATIVE)  


 


Urine WBC (Auto)     (0-5)  /HPF


 


Urine RBC (Auto)     (0-2)  /HPF


 


U Hyaline Cast (Auto)     (0-2)  /LPF


 


U Epithel Cells (Auto)     (FEW)  /HPF


 


Urine Bacteria (Auto)     (NEGATIVE)  /HPF


 


Urine Mucus (Auto)     (NEGATIVE)  /HPF


 


Urine Culture Reflexed     (NO)  


 


Urine Glucose     (NEGATIVE)  mg/dL


 


Urine Opiates Level  NEGATIVE    (NEGATIVE)  


 


Ur Methadone  NEGATIVE    (NEGATIVE)  


 


Urine Barbiturates  NEGATIVE    (NEGATIVE)  


 


Ur Phencyclidine (PCP)  NEGATIVE    (NEGATIVE)  


 


Urine Amphetamine  NEGATIVE    (NEGATIVE)  


 


U Benzodiazepine Level  NEGATIVE    (NEGATIVE)  


 


Urine Cocaine  NEGATIVE    (NEGATIVE)  


 


Urine Marijuana (THC)  POSITIVE    (NEGATIVE)  














  06/14/20 06/15/20 06/15/20 Range/Units





  23:43 02:35 04:15 


 


WBC    7.7  (4.0-10.5)  K/mm3


 


RBC    4.64  (4.1-5.6)  M/mm3


 


Hgb    16.7  (12.5-18.0)  gm/dl


 


Hct    48.3  (42-50)  %


 


MCV    104.1 H  ()  fl


 


MCH    36.0 H  (26-32)  pg


 


MCHC    34.6  (32-36)  g/dl


 


RDW    14.7 H  (11.5-14.0)  %


 


Plt Count    198  (150-450)  K/mm3


 


MPV    9.9  (7.5-11.0)  fl


 


Gran %    54.8  (36.0-66.0)  %


 


Eos # (Auto)    0.30  (0-0.5)  


 


Absolute Lymphs (auto)    2.22  (1.0-4.6)  


 


Absolute Monos (auto)    0.89  (0.0-1.3)  


 


Lymphocytes %    29.0  (24.0-44.0)  %


 


Monocytes %    11.6  (0.0-12.0)  %


 


Eosinophils %    3.9  (0.00-5.0)  %


 


Basophils %    0.7  (0.0-0.4)  %


 


Absolute Granulocytes    4.20  (1.4-6.9)  


 


Basophils #    0.05  (0-0.4)  


 


D-Dimer     (215-500)  ng/mL


 


Sodium     (137-145)  mmol/L


 


Potassium     (3.5-5.1)  mmol/L


 


Chloride     ()  mmol/L


 


Carbon Dioxide     (22-30)  mmol/L


 


Anion Gap     (5-15)  MEQ/L


 


BUN     (9-20)  mg/dL


 


Creatinine     (0.66-1.25)  mg/dL


 


Estimated GFR     ML/MIN


 


Glucose     ()  mg/dL


 


Lactic Acid     (0.4-2.0)  


 


Calcium     (8.4-10.2)  mg/dL


 


Total Bilirubin     (0.2-1.3)  mg/dL


 


AST     (17-59)  U/L


 


ALT     (0-50)  U/L


 


Alkaline Phosphatase     ()  U/L


 


Creatine Kinase     ()  U/L


 


Troponin I  < 0.012  < 0.012   (0.000-0.034)  ng/mL


 


Serum Total Protein     (6.3-8.2)  g/dL


 


Albumin     (3.5-5.0)  g/dL


 


Lipase     ()  U/L


 


Urine Color     (YELLOW)  


 


Urine Appearance     (CLEAR)  


 


Urine pH     (5-6)  


 


Ur Specific Gravity     (1.005-1.025)  


 


Urine Protein     (Negative)  


 


Urine Ketones     (NEGATIVE)  


 


Urine Blood     (0-5)  Finesse/ul


 


Urine Nitrite     (NEGATIVE)  


 


Urine Bilirubin     (NEGATIVE)  


 


Urine Urobilinogen     (0-1)  mg/dL


 


Ur Leukocyte Esterase     (NEGATIVE)  


 


Urine WBC (Auto)     (0-5)  /HPF


 


Urine RBC (Auto)     (0-2)  /HPF


 


U Hyaline Cast (Auto)     (0-2)  /LPF


 


U Epithel Cells (Auto)     (FEW)  /HPF


 


Urine Bacteria (Auto)     (NEGATIVE)  /HPF


 


Urine Mucus (Auto)     (NEGATIVE)  /HPF


 


Urine Culture Reflexed     (NO)  


 


Urine Glucose     (NEGATIVE)  mg/dL


 


Urine Opiates Level     (NEGATIVE)  


 


Ur Methadone     (NEGATIVE)  


 


Urine Barbiturates     (NEGATIVE)  


 


Ur Phencyclidine (PCP)     (NEGATIVE)  


 


Urine Amphetamine     (NEGATIVE)  


 


U Benzodiazepine Level     (NEGATIVE)  


 


Urine Cocaine     (NEGATIVE)  


 


Urine Marijuana (THC)     (NEGATIVE)  














  06/15/20 Range/Units





  04:30 


 


WBC   (4.0-10.5)  K/mm3


 


RBC   (4.1-5.6)  M/mm3


 


Hgb   (12.5-18.0)  gm/dl


 


Hct   (42-50)  %


 


MCV   ()  fl


 


MCH   (26-32)  pg


 


MCHC   (32-36)  g/dl


 


RDW   (11.5-14.0)  %


 


Plt Count   (150-450)  K/mm3


 


MPV   (7.5-11.0)  fl


 


Gran %   (36.0-66.0)  %


 


Eos # (Auto)   (0-0.5)  


 


Absolute Lymphs (auto)   (1.0-4.6)  


 


Absolute Monos (auto)   (0.0-1.3)  


 


Lymphocytes %   (24.0-44.0)  %


 


Monocytes %   (0.0-12.0)  %


 


Eosinophils %   (0.00-5.0)  %


 


Basophils %   (0.0-0.4)  %


 


Absolute Granulocytes   (1.4-6.9)  


 


Basophils #   (0-0.4)  


 


D-Dimer   (215-500)  ng/mL


 


Sodium  139  (137-145)  mmol/L


 


Potassium  4.6  (3.5-5.1)  mmol/L


 


Chloride  106  ()  mmol/L


 


Carbon Dioxide  28  (22-30)  mmol/L


 


Anion Gap  9.2  (5-15)  MEQ/L


 


BUN  11  (9-20)  mg/dL


 


Creatinine  0.82  (0.66-1.25)  mg/dL


 


Estimated GFR  > 60.0  ML/MIN


 


Glucose  97  ()  mg/dL


 


Lactic Acid   (0.4-2.0)  


 


Calcium  9.1  (8.4-10.2)  mg/dL


 


Total Bilirubin  1.10  (0.2-1.3)  mg/dL


 


AST  52  (17-59)  U/L


 


ALT  88 H  (0-50)  U/L


 


Alkaline Phosphatase  69  ()  U/L


 


Creatine Kinase   ()  U/L


 


Troponin I   (0.000-0.034)  ng/mL


 


Serum Total Protein  7.7  (6.3-8.2)  g/dL


 


Albumin  4.1  (3.5-5.0)  g/dL


 


Lipase   ()  U/L


 


Urine Color   (YELLOW)  


 


Urine Appearance   (CLEAR)  


 


Urine pH   (5-6)  


 


Ur Specific Gravity   (1.005-1.025)  


 


Urine Protein   (Negative)  


 


Urine Ketones   (NEGATIVE)  


 


Urine Blood   (0-5)  Finesse/ul


 


Urine Nitrite   (NEGATIVE)  


 


Urine Bilirubin   (NEGATIVE)  


 


Urine Urobilinogen   (0-1)  mg/dL


 


Ur Leukocyte Esterase   (NEGATIVE)  


 


Urine WBC (Auto)   (0-5)  /HPF


 


Urine RBC (Auto)   (0-2)  /HPF


 


U Hyaline Cast (Auto)   (0-2)  /LPF


 


U Epithel Cells (Auto)   (FEW)  /HPF


 


Urine Bacteria (Auto)   (NEGATIVE)  /HPF


 


Urine Mucus (Auto)   (NEGATIVE)  /HPF


 


Urine Culture Reflexed   (NO)  


 


Urine Glucose   (NEGATIVE)  mg/dL


 


Urine Opiates Level   (NEGATIVE)  


 


Ur Methadone   (NEGATIVE)  


 


Urine Barbiturates   (NEGATIVE)  


 


Ur Phencyclidine (PCP)   (NEGATIVE)  


 


Urine Amphetamine   (NEGATIVE)  


 


U Benzodiazepine Level   (NEGATIVE)  


 


Urine Cocaine   (NEGATIVE)  


 


Urine Marijuana (THC)   (NEGATIVE)  














- Radiology Exams


Ordered Rad Exams-Entire Visit: 


 Radiology Procedures











 Category Date Time Status


 


 CHEST 1 VIEW (PORTABLE) Stat Exams  06/14/20 17:27 Completed


 


 HEAD WITHOUT CONTRAST [CT] Stat Exams  06/14/20 17:27 Completed














- Discharge


Disposition: Home, Self-Care


Condition: Stable


Prescriptions: 


No Action


   Aspirin EC 81 mg*** [Ecotrin 81 mg***] 81 mg PO DAILY


   Metoprolol Succinate 25 mg Xl* [Toprol-Xl 25MG Tablets***] 25 mg PO DAILY


   Isosorbide Mononitrate 60 mg** [Imdur 60MG**] 60 mg PO DAILY


   Omeprazole 20 mg PO DAILY


Follow up with: 


DOCTOR,NO FAMILY [Primary Care Provider] - 1 Week

## 2020-06-15 NOTE — XRAY
Indication: Change in level of consciousness.  Chest pain.



Multiple contiguous axial images obtained through the head without contrast.



Comparison: February 28, 2017.



Again normal appearing brain parenchyma, ventricles, and bony calvarium.

Stable partial opacification of the mastoid air cells again right greater than

left.  Visualized paranasal sinuses are clear.



Impression:

1.  Stable opacification of both mastoid air cells again presumed inflammatory.

2.  Remaining CT head without contrast exam is normal.



Comment: Preliminary interpretation was made by [facility].  No critical discrepancy.

## 2021-02-23 ENCOUNTER — HOSPITAL ENCOUNTER (OUTPATIENT)
Dept: HOSPITAL 33 - ED | Age: 46
Setting detail: OBSERVATION
LOS: 1 days | Discharge: HOME | End: 2021-02-24
Attending: FAMILY MEDICINE | Admitting: FAMILY MEDICINE
Payer: MEDICAID

## 2021-02-23 DIAGNOSIS — R06.2: ICD-10-CM

## 2021-02-23 DIAGNOSIS — E78.00: ICD-10-CM

## 2021-02-23 DIAGNOSIS — R11.2: ICD-10-CM

## 2021-02-23 DIAGNOSIS — F12.90: ICD-10-CM

## 2021-02-23 DIAGNOSIS — Z79.899: ICD-10-CM

## 2021-02-23 DIAGNOSIS — R19.7: ICD-10-CM

## 2021-02-23 DIAGNOSIS — I10: ICD-10-CM

## 2021-02-23 DIAGNOSIS — R42: ICD-10-CM

## 2021-02-23 DIAGNOSIS — R07.9: Primary | ICD-10-CM

## 2021-02-23 LAB
ALBUMIN SERPL-MCNC: 4.5 G/DL (ref 3.5–5)
ALP SERPL-CCNC: 53 U/L (ref 38–126)
ALT SERPL-CCNC: 47 U/L (ref 0–50)
ANION GAP SERPL CALC-SCNC: 17 MEQ/L (ref 5–15)
APTT PPP: 31.7 SECONDS (ref 24.1–36.1)
AST SERPL QL: 33 U/L (ref 17–59)
BASOPHILS # BLD AUTO: 0.04 10*3/UL (ref 0–0.4)
BASOPHILS NFR BLD AUTO: 0.5 % (ref 0–0.4)
BILIRUB BLD-MCNC: 0.2 MG/DL (ref 0.2–1.3)
BNP SERPL-MCNC: 75.7 PG/ML (ref 0–450)
BUN SERPL-MCNC: 12 MG/DL (ref 9–20)
CALCIUM SPEC-MCNC: 9.7 MG/DL (ref 8.4–10.2)
CHLORIDE SERPL-SCNC: 105 MMOL/L (ref 98–107)
CO2 SERPL-SCNC: 22 MMOL/L (ref 22–30)
CREAT SERPL-MCNC: 0.79 MG/DL (ref 0.66–1.25)
D DIMER BLD IA.RAPID-MCNC: 243 NG/ML (ref 215–500)
EOSINOPHIL # BLD AUTO: 0.18 10*3/UL (ref 0–0.5)
ETHANOL SERPL-MCNC: 159 MG/DL (ref 0–10)
GFR SERPLBLD BASED ON 1.73 SQ M-ARVRAT: > 60 ML/MIN
GLUCOSE SERPL-MCNC: 90 MG/DL (ref 74–106)
GLUCOSE UR-MCNC: NEGATIVE MG/DL
HCT VFR BLD AUTO: 46.6 % (ref 42–50)
HGB BLD-MCNC: 15.4 GM/DL (ref 12.5–18)
INR PPP: 0.95 (ref 0.8–3)
LYMPHOCYTES # SPEC AUTO: 3.22 10*3/UL (ref 1–4.6)
MAGNESIUM SERPL-MCNC: 2 MG/DL (ref 1.6–2.3)
MCH RBC QN AUTO: 33.5 PG (ref 26–32)
MCHC RBC AUTO-ENTMCNC: 33 G/DL (ref 32–36)
MONOCYTES # BLD AUTO: 0.69 10*3/UL (ref 0–1.3)
PLATELET # BLD AUTO: 252 K/MM3 (ref 150–450)
POTASSIUM SERPLBLD-SCNC: 3.9 MMOL/L (ref 3.5–5.1)
PROT SERPL-MCNC: 7.8 G/DL (ref 6.3–8.2)
PROT UR STRIP-MCNC: NEGATIVE MG/DL
PROTHROMBIN TIME: 10.7 SECONDS (ref 8.83–12.87)
RBC # BLD AUTO: 4.6 M/MM3 (ref 4.1–5.6)
SODIUM SERPL-SCNC: 140 MMOL/L (ref 137–145)
WBC # BLD AUTO: 7.5 K/MM3 (ref 4–10.5)

## 2021-02-23 PROCEDURE — 85379 FIBRIN DEGRADATION QUANT: CPT

## 2021-02-23 PROCEDURE — 80061 LIPID PANEL: CPT

## 2021-02-23 PROCEDURE — G0378 HOSPITAL OBSERVATION PER HR: HCPCS

## 2021-02-23 PROCEDURE — 0241U: CPT

## 2021-02-23 PROCEDURE — 83880 ASSAY OF NATRIURETIC PEPTIDE: CPT

## 2021-02-23 PROCEDURE — 93306 TTE W/DOPPLER COMPLETE: CPT

## 2021-02-23 PROCEDURE — 83721 ASSAY OF BLOOD LIPOPROTEIN: CPT

## 2021-02-23 PROCEDURE — 85730 THROMBOPLASTIN TIME PARTIAL: CPT

## 2021-02-23 PROCEDURE — 80053 COMPREHEN METABOLIC PANEL: CPT

## 2021-02-23 PROCEDURE — 96374 THER/PROPH/DIAG INJ IV PUSH: CPT

## 2021-02-23 PROCEDURE — 36415 COLL VENOUS BLD VENIPUNCTURE: CPT

## 2021-02-23 PROCEDURE — 94760 N-INVAS EAR/PLS OXIMETRY 1: CPT

## 2021-02-23 PROCEDURE — 93268 ECG RECORD/REVIEW: CPT

## 2021-02-23 PROCEDURE — 94762 N-INVAS EAR/PLS OXIMTRY CONT: CPT

## 2021-02-23 PROCEDURE — 83735 ASSAY OF MAGNESIUM: CPT

## 2021-02-23 PROCEDURE — 80307 DRUG TEST PRSMV CHEM ANLYZR: CPT

## 2021-02-23 PROCEDURE — G0480 DRUG TEST DEF 1-7 CLASSES: HCPCS

## 2021-02-23 PROCEDURE — 36000 PLACE NEEDLE IN VEIN: CPT

## 2021-02-23 PROCEDURE — 85025 COMPLETE CBC W/AUTO DIFF WBC: CPT

## 2021-02-23 PROCEDURE — 84484 ASSAY OF TROPONIN QUANT: CPT

## 2021-02-23 PROCEDURE — 71045 X-RAY EXAM CHEST 1 VIEW: CPT

## 2021-02-23 PROCEDURE — 93041 RHYTHM ECG TRACING: CPT

## 2021-02-23 PROCEDURE — 99284 EMERGENCY DEPT VISIT MOD MDM: CPT

## 2021-02-23 PROCEDURE — 81001 URINALYSIS AUTO W/SCOPE: CPT

## 2021-02-23 PROCEDURE — 85610 PROTHROMBIN TIME: CPT

## 2021-02-23 PROCEDURE — 93005 ELECTROCARDIOGRAM TRACING: CPT
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
## 2021-02-24 VITALS — SYSTOLIC BLOOD PRESSURE: 126 MMHG | DIASTOLIC BLOOD PRESSURE: 60 MMHG

## 2021-02-24 VITALS — OXYGEN SATURATION: 96 % | HEART RATE: 89 BPM

## 2021-02-24 LAB
AMPHETAMINES UR QL: NEGATIVE
BARBITURATES UR QL: NEGATIVE
BENZODIAZ UR QL SCN: NEGATIVE
CHOLESTANOL SERPL-MCNC: 233 MG/DL (ref 50–200)
COCAINE UR QL SCN: NEGATIVE
FLUAV AG NPH QL IA: NEGATIVE
FLUBV AG NPH QL IA: NEGATIVE
HDLC SERPL-MCNC: 53 MG/DL (ref 40–60)
LDLC SERPL DIRECT ASSAY-MCNC: 154 MG/DL (ref 30–100)
METHADONE UR QL: NEGATIVE
OPIATES UR QL: NEGATIVE
PCP UR QL CFM>20 NG/ML: NEGATIVE
RBC #/AREA URNS HPF: (no result) /HPF (ref 0–2)
RSV AG SPEC QL IA: NEGATIVE
THC UR QL SCN: POSITIVE
TRIGL SERPL-MCNC: 206 MG/DL (ref 30–150)
WBC #/AREA URNS HPF: (no result) /HPF (ref 0–5)

## 2021-02-24 NOTE — XRAY
Indication: Chest pain.



Comparison: June 14, 2020.



Portable apical lordotic chest remains clear.  Heart is not enlarged.  Bony

thorax intact again with distal right clavicle heterotopic ossifications.  No

new/acute findings.

## 2021-02-24 NOTE — PCM.SSS
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     (4.1-5.6)  M/mm3


 


Hgb     (12.5-18.0)  gm/dl


 


Hct     (42-50)  %


 


MCV     ()  fl


 


MCH     (26-32)  pg


 


MCHC     (32-36)  g/dl


 


RDW     (11.5-14.0)  %


 


Plt Count     (150-450)  K/mm3


 


MPV     (7.5-11.0)  fl


 


Gran %     (36.0-66.0)  %


 


Eos # (Auto)     (0-0.5)  


 


Absolute Lymphs (auto)     (1.0-4.6)  


 


Absolute Monos (auto)     (0.0-1.3)  


 


Lymphocytes %     (24.0-44.0)  %


 


Monocytes %     (0.0-12.0)  %


 


Eosinophils %     (0.00-5.0)  %


 


Basophils %     (0.0-0.4)  %


 


Absolute Granulocytes     (1.4-6.9)  


 


Basophils #     (0-0.4)  


 


PT     (8.83-12.87)  SECONDS


 


INR     (0.8-3.0)  


 


APTT     (24.1-36.1)  SECONDS


 


D-Dimer     (215-500)  ng/mL


 


Sodium     (137-145)  mmol/L


 


Potassium     (3.5-5.1)  mmol/L


 


Chloride     ()  mmol/L


 


Carbon Dioxide     (22-30)  mmol/L


 


Anion Gap     (5-15)  MEQ/L


 


BUN     (9-20)  mg/dL


 


Creatinine     (0.66-1.25)  mg/dL


 


Estimated GFR     ML/MIN


 


Glucose     ()  mg/dL


 


Calcium     (8.4-10.2)  mg/dL


 


Magnesium     (1.6-2.3)  mg/dL


 


Total Bilirubin     (0.2-1.3)  mg/dL


 


AST     (17-59)  U/L


 


ALT     (0-50)  U/L


 


Alkaline Phosphatase     ()  U/L


 


Troponin I   < 0.012  < 0.012  (0.000-0.034)  ng/mL


 


NT-Pro-B Natriuret Pep     (0-450)  pg/mL


 


Serum Total Protein     (6.3-8.2)  g/dL


 


Albumin     (3.5-5.0)  g/dL


 


Triglycerides     ()  mg/dL


 


Cholesterol     ()  mg/dL


 


LDL Cholesterol     ()  mg/dL


 


HDL Cholesterol     (40-60)  mg/dL


 


Heart Disease Risk Ratio     


 


Urine Color     (YELLOW)  


 


Urine Appearance     (CLEAR)  


 


Urine pH     (5-6)  


 


Ur Specific Gravity     (1.005-1.025)  


 


Urine Protein     (Negative)  


 


Urine Ketones     (NEGATIVE)  


 


Urine Blood     (0-5)  Finesse/ul


 


Urine Nitrite     (NEGATIVE)  


 


Urine Bilirubin     (NEGATIVE)  


 


Urine Urobilinogen     (0-1)  mg/dL


 


Ur Leukocyte Esterase     (NEGATIVE)  


 


Urine WBC (Auto)     (0-5)  /HPF


 


Urine RBC (Auto)     (0-2)  /HPF


 


U Epithel Cells (Auto)     (FEW)  /HPF


 


Urine Bacteria (Auto)     (NEGATIVE)  /HPF


 


Urine Culture Reflexed     (NO)  


 


Urine Glucose     (NEGATIVE)  mg/dL


 


Urine Opiates Level     (NEGATIVE)  


 


Ur Methadone     (NEGATIVE)  


 


Urine Barbiturates     (NEGATIVE)  


 


Ur Phencyclidine (PCP)     (NEGATIVE)  


 


Urine Amphetamine     (NEGATIVE)  


 


U Benzodiazepine Level     (NEGATIVE)  


 


Urine Cocaine     (NEGATIVE)  


 


Urine Marijuana (THC)     (NEGATIVE)  


 


Ethyl Alcohol     (0-10)  mg/dL


 


Influenza Type A Ag  NEGATIVE    (NEGATIVE)  


 


Influenza Type B Ag  NEGATIVE    (NEGATIVE)  


 


RSV (PCR)  NEGATIVE    (Negative)  


 


SARS-CoV-2 (PCR)  NEGATIVE    (NEGATIVE)  














  02/24/21 Range/Units





  04:40 


 


WBC   (4.0-10.5)  K/mm3


 


RBC   (4.1-5.6)  M/mm3


 


Hgb   (12.5-18.0)  gm/dl


 


Hct   (42-50)  %


 


MCV   ()  fl


 


MCH   (26-32)  pg


 


MCHC   (32-36)  g/dl


 


RDW   (11.5-14.0)  %


 


Plt Count   (150-450)  K/mm3


 


MPV   (7.5-11.0)  fl


 


Gran %   (36.0-66.0)  %


 


Eos # (Auto)   (0-0.5)  


 


Absolute Lymphs (auto)   (1.0-4.6)  


 


Absolute Monos (auto)   (0.0-1.3)  


 


Lymphocytes %   (24.0-44.0)  %


 


Monocytes %   (0.0-12.0)  %


 


Eosinophils %   (0.00-5.0)  %


 


Basophils %   (0.0-0.4)  %


 


Absolute Granulocytes   (1.4-6.9)  


 


Basophils #   (0-0.4)  


 


PT   (8.83-12.87)  SECONDS


 


INR   (0.8-3.0)  


 


APTT   (24.1-36.1)  SECONDS


 


D-Dimer   (215-500)  ng/mL


 


Sodium   (137-145)  mmol/L


 


Potassium   (3.5-5.1)  mmol/L


 


Chloride   ()  mmol/L


 


Carbon Dioxide   (22-30)  mmol/L


 


Anion Gap   (5-15)  MEQ/L


 


BUN   (9-20)  mg/dL


 


Creatinine   (0.66-1.25)  mg/dL


 


Estimated GFR   ML/MIN


 


Glucose   ()  mg/dL


 


Calcium   (8.4-10.2)  mg/dL


 


Magnesium   (1.6-2.3)  mg/dL


 


Total Bilirubin   (0.2-1.3)  mg/dL


 


AST   (17-59)  U/L


 


ALT   (0-50)  U/L


 


Alkaline Phosphatase   ()  U/L


 


Troponin I   (0.000-0.034)  ng/mL


 


NT-Pro-B Natriuret Pep   (0-450)  pg/mL


 


Serum Total Protein   (6.3-8.2)  g/dL


 


Albumin   (3.5-5.0)  g/dL


 


Triglycerides  206 H  ()  mg/dL


 


Cholesterol  233 H  ()  mg/dL


 


LDL Cholesterol  154 H  ()  mg/dL


 


HDL Cholesterol  53  (40-60)  mg/dL


 


Heart Disease Risk Ratio  4.4  


 


Urine Color   (YELLOW)  


 


Urine Appearance   (CLEAR)  


 


Urine pH   (5-6)  


 


Ur Specific Gravity   (1.005-1.025)  


 


Urine Protein   (Negative)  


 


Urine Ketones   (NEGATIVE)  


 


Urine Blood   (0-5)  Finesse/ul


 


Urine Nitrite   (NEGATIVE)  


 


Urine Bilirubin   (NEGATIVE)  


 


Urine Urobilinogen   (0-1)  mg/dL


 


Ur Leukocyte Esterase   (NEGATIVE)  


 


Urine WBC (Auto)   (0-5)  /HPF


 


Urine RBC (Auto)   (0-2)  /HPF


 


U Epithel Cells (Auto)   (FEW)  /HPF


 


Urine Bacteria (Auto)   (NEGATIVE)  /HPF


 


Urine Culture Reflexed   (NO)  


 


Urine Glucose   (NEGATIVE)  mg/dL


 


Urine Opiates Level   (NEGATIVE)  


 


Ur Methadone   (NEGATIVE)  


 


Urine Barbiturates   (NEGATIVE)  


 


Ur Phencyclidine (PCP)   (NEGATIVE)  


 


Urine Amphetamine   (NEGATIVE)  


 


U Benzodiazepine Level   (NEGATIVE)  


 


Urine Cocaine   (NEGATIVE)  


 


Urine Marijuana (THC)   (NEGATIVE)  


 


Ethyl Alcohol   (0-10)  mg/dL


 


Influenza Type A Ag   (NEGATIVE)  


 


Influenza Type B Ag   (NEGATIVE)  


 


RSV (PCR)   (Negative)  


 


SARS-CoV-2 (PCR)   (NEGATIVE)  














- Radiology Exams


Ordered Rad Exams-Entire Visit: 


                              Radiology Procedures











 Category Date Time Status


 


 CHEST 1 VIEW (PORTABLE) Stat Exams  02/23/21 22:28 Taken


 


 ECHO W/2D AND DOPPLER [US] Routine Exams  02/24/21 Ordered














- Procedures and Test


Procedures and Tests throughout Hospitalization: 


                            Therapy Orders & Screens





02/24/21 02:02


EKG Q8HX2,QAMX3,PRN 


   Comment: 





02/24/21 06:28


EKG ROUTINE 


   Comment: 


   Diagnosis: ACS





02/24/21 08:24


Holter Monitor ONCE 


   Reason For Exam: 


   Comment: 24 hour


   Diagnosis: ACS, palpitations





02/25/21 05:00


EKG DAILY 


   Comment: 


   Diagnosis: ACS





02/26/21 05:00


EKG DAILY 


   Comment: 


   Diagnosis: ACS





02/27/21 05:00


EKG DAILY 


   Comment: 


   Diagnosis: ACS














- Discharge


Disposition: Home, Self-Care


Condition: Stable


Prescriptions: 


No Action


   Aspirin EC 81 mg*** [Ecotrin 81 mg***] 81 mg PO DAILY


   Isosorbide Mononitrate 60 mg** [Imdur 60MG**] 30 mg PO DAILY


   Omeprazole 20 mg PO DAILY PRN


     PRN Reason: Indigestion


   Metoprolol Tartrate 50 mg*** [Lopressor 50 MG***] 50 mg PO QAM


   Amlodipine Besylate [Norvasc] 10 mg PO DAILY


   Metoprolol Tartrate 50 mg*** [Lopressor 50 MG***] 25 mg PO QHS


Follow up with: 


DOCTOR,NO FAMILY [Primary Care Provider] -

## 2021-02-24 NOTE — PCM.DCORD
- Discharge


Disposition: Home, Self-Care


Condition: Stable


Prescriptions: 


New


   Fluticasone Propionate*** [Flovent 110 Mcg MDI***] 1 g IH BID #1 inh





Continue


   Aspirin EC 81 mg*** [Ecotrin 81 mg***] 81 mg PO DAILY


   Isosorbide Mononitrate 60 mg** [Imdur 60MG**] 30 mg PO DAILY


   Omeprazole 20 mg PO DAILY PRN


     PRN Reason: Indigestion


   Metoprolol Tartrate 50 mg*** [Lopressor 50 MG***] 50 mg PO QAM


   Amlodipine Besylate [Norvasc] 10 mg PO DAILY


   Metoprolol Tartrate 50 mg*** [Lopressor 50 MG***] 25 mg PO QHS


Follow up with: 


CONRADO SCHILLING [ACTIVE STAFF] - 03/05/21 2:15 pm

## 2021-02-25 NOTE — ECHO
DATE OF PROCEDURE:    02/24/2021 



CLINICAL INFORMATION:  Palpitations.  



The M-mode 2D, and Doppler echocardiogram including color flow Doppler shows the left 
ventricle is normal in size at 4.7 cm. There is no thrombus present. The septal wall 
thickness is increased at 1.3 cm. The left ventricular posterior wall thickness is 
increased at 1.4 cm. There is normal contractility of the left ventricle. The ejection 
fraction is calculated to be 62%. The right ventricle is grossly normal. The left atrium 
is mildly dilated with a dimension of 4.2 cm. The interatrial septum is intact. The right 
atrium is dilated. The aortic valve opens well. It is trileaflet. There is no aortic 
regurgitation. There is mitral valve leaflet thickening. There is mitral regurgitation. 
The tricuspid valve is normal. The pulmonic valve is not well visualized. The aortic root 
is normal at 2.9 cm. There is no pericardial effusion present.  



IMPRESSION:

1) NORMAL CONTRACTILITY OF THE LEFT VENTRICLE.  

2) MILD CONCENTRIC LEFT VENTRICULAR HYPERTROPHY.